# Patient Record
Sex: MALE | Employment: STUDENT | URBAN - NONMETROPOLITAN AREA
[De-identification: names, ages, dates, MRNs, and addresses within clinical notes are randomized per-mention and may not be internally consistent; named-entity substitution may affect disease eponyms.]

---
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
## 2025-02-18 ENCOUNTER — OFFICE VISIT (OUTPATIENT)
Age: 20
End: 2025-02-18

## 2025-02-18 VITALS
TEMPERATURE: 98.2 F | OXYGEN SATURATION: 100 % | SYSTOLIC BLOOD PRESSURE: 116 MMHG | HEART RATE: 96 BPM | DIASTOLIC BLOOD PRESSURE: 72 MMHG | RESPIRATION RATE: 16 BRPM

## 2025-02-18 DIAGNOSIS — J02.9 ACUTE VIRAL PHARYNGITIS: Primary | ICD-10-CM

## 2025-02-18 LAB
INFLUENZA VIRUS A RNA: NEGATIVE
INFLUENZA VIRUS B RNA: NEGATIVE
STREPTOCOCCUS A RNA: NEGATIVE

## 2025-02-18 PROCEDURE — 87502 INFLUENZA DNA AMP PROBE: CPT | Performed by: NURSE PRACTITIONER

## 2025-02-18 PROCEDURE — 99213 OFFICE O/P EST LOW 20 MIN: CPT | Performed by: NURSE PRACTITIONER

## 2025-02-18 PROCEDURE — 87651 STREP A DNA AMP PROBE: CPT | Performed by: NURSE PRACTITIONER

## 2025-02-18 SDOH — ECONOMIC STABILITY: FOOD INSECURITY: WITHIN THE PAST 12 MONTHS, YOU WORRIED THAT YOUR FOOD WOULD RUN OUT BEFORE YOU GOT MONEY TO BUY MORE.: NEVER TRUE

## 2025-02-18 SDOH — ECONOMIC STABILITY: FOOD INSECURITY: WITHIN THE PAST 12 MONTHS, THE FOOD YOU BOUGHT JUST DIDN'T LAST AND YOU DIDN'T HAVE MONEY TO GET MORE.: NEVER TRUE

## 2025-02-18 ASSESSMENT — ENCOUNTER SYMPTOMS
DIARRHEA: 0
WHEEZING: 0
COUGH: 0
CHEST TIGHTNESS: 0
NAUSEA: 0
TROUBLE SWALLOWING: 1
SHORTNESS OF BREATH: 0
ABDOMINAL PAIN: 0
VOMITING: 0
SORE THROAT: 1

## 2025-02-18 ASSESSMENT — PATIENT HEALTH QUESTIONNAIRE - PHQ9
2. FEELING DOWN, DEPRESSED OR HOPELESS: NOT AT ALL
SUM OF ALL RESPONSES TO PHQ QUESTIONS 1-9: 0
SUM OF ALL RESPONSES TO PHQ QUESTIONS 1-9: 0
SUM OF ALL RESPONSES TO PHQ9 QUESTIONS 1 & 2: 0
SUM OF ALL RESPONSES TO PHQ QUESTIONS 1-9: 0
SUM OF ALL RESPONSES TO PHQ QUESTIONS 1-9: 0
1. LITTLE INTEREST OR PLEASURE IN DOING THINGS: NOT AT ALL

## 2025-02-18 NOTE — PROGRESS NOTES
[facility] [place] [facility]  [facility]  [address] [place] [address]  Dept: [phone]  Loc: [phone]     Austyn Lewis is a 19 y.o. male who presents today for:  Chief Complaint   Patient presents with    Pharyngitis       Assessment/Plan:     1. Acute viral pharyngitis  -strep and flu neg.  Erythema noted to throat, tonsils are WNL.  Throat culture obtained  -f/u with any new/worsening sxs  - Culture, Throat       Results for orders placed or performed in visit on 02/18/25   POCT Influenza A/B DNA (Alere i)   Result Value Ref Range    Influenza virus A RNA Negative     Influenza virus B RNA Negative    POCT Rapid Strep A DNA (Alere i)   Result Value Ref Range    Streptococcus A RNA negative         Medications Ordered:  No orders of the defined types were placed in this encounter.      No follow-ups on file.    No future appointments.    HPI:   Pt presents with sore throat, body aches, chills, congestion x3 days.  Denies fever, cough, and N/V/D.  Has taken OTC cold medicine with minimal relief.  Sxs are similar to when he's had strep in the past.  Took home covid test and results were neg.    Pharyngitis  This is a new problem. The current episode started in the past 7 days. Associated symptoms include chills, congestion, myalgias and a sore throat. Pertinent negatives include no abdominal pain, coughing, fever, headaches, nausea, neck pain or vomiting. He has tried acetaminophen for the symptoms. The treatment provided mild relief.         Subjective:      Review of Systems   Constitutional:  Positive for chills. Negative for fever.   HENT:  Positive for congestion, sore throat and trouble swallowing (pain with swallowing).    Respiratory:  Negative for cough, chest tightness, shortness of breath and wheezing.    Gastrointestinal:  Negative for abdominal pain, diarrhea, nausea and vomiting.   Musculoskeletal:  Positive for myalgias. Negative for neck

## 2025-02-20 LAB — BACTERIA THROAT AEROBE CULT: NORMAL
